# Patient Record
Sex: FEMALE | RURAL
[De-identification: names, ages, dates, MRNs, and addresses within clinical notes are randomized per-mention and may not be internally consistent; named-entity substitution may affect disease eponyms.]

---

## 2022-05-17 RX ORDER — TRIAMTERENE AND HYDROCHLOROTHIAZIDE 37.5; 25 MG/1; MG/1
TABLET ORAL
Qty: 90 TABLET | Refills: 0 | OUTPATIENT
Start: 2022-05-17

## 2022-05-17 NOTE — TELEPHONE ENCOUNTER
Last appt 09/20/21. Last RF 09/20/21 90 w. 1RF. Needs appt. Let me know if she schedules and I will send in enough to last until appt. Left message. Thanks.

## 2022-05-17 NOTE — TELEPHONE ENCOUNTER
PT CALLED BACK AND I TOLD HER SHE NEEDED TO MAKE AN APPT AND SHE SAID SHE IS [place]OWN AT THE MOMENT AND SHE IS OUT OF MEDICATION. THEN PT HUNG UP

## 2022-10-15 RX ORDER — LANCETS 28 GAUGE
EACH MISCELLANEOUS
Qty: 100 EACH | Refills: 0 | Status: SHIPPED | OUTPATIENT
Start: 2022-10-15

## 2022-10-17 RX ORDER — BLOOD-GLUCOSE METER
KIT MISCELLANEOUS
Qty: 100 EACH | Refills: 0 | OUTPATIENT
Start: 2022-10-17

## 2022-10-20 ENCOUNTER — TELEPHONE (OUTPATIENT)
Dept: FAMILY MEDICINE CLINIC | Facility: CLINIC | Age: 52
End: 2022-10-20

## 2022-10-20 RX ORDER — BLOOD-GLUCOSE METER
KIT MISCELLANEOUS
Qty: 100 EACH | Refills: 0 | Status: SHIPPED | OUTPATIENT
Start: 2022-10-20

## 2022-10-20 NOTE — TELEPHONE ENCOUNTER
Sent 1 mo of test strips, has been notified multiple times about needing appt (last appt 09/2021) has yet to be seen. Let her know if she does not make an appointment she will need to find another pcp